# Patient Record
(demographics unavailable — no encounter records)

---

## 2025-04-23 NOTE — HISTORY OF PRESENT ILLNESS
[de-identified] : Mr. STEFFEN CAIN  is a 50 year old male who presents to the office for a follow-up visit.   He continues to have low back pain with radicular symptoms.  He has participated in PT last year and would like to do PT for his lower back.

## 2025-04-23 NOTE — DISCUSSION/SUMMARY
[de-identified] : We discussed further treatment options. He would like to try some additional physical therapy. He also continues to have pain. I have recommended a lumbar MRI. Follow up afterwards.

## 2025-04-23 NOTE — HISTORY OF PRESENT ILLNESS
[de-identified] : Mr. STEFFEN CAIN  is a 50 year old male who presents to the office for a follow-up visit.   He continues to have low back pain with radicular symptoms.  He has participated in PT last year and would like to do PT for his lower back.

## 2025-04-23 NOTE — PHYSICAL EXAM
[de-identified] : Examination of the lumbar spine reveals no midline tenderness palpation, step-offs, or skin lesions. Decreased range of motion with respect to flexion, extension, lateral bending, and rotation. No tenderness to palpation of the sciatic notch. No tenderness palpation of the bilateral greater trochanters. No pain with passive internal/external rotation of the hips. No instability of bilateral lower extremities.  Negative TAVO. Negative straight leg raise bilaterally. No bowstring. Negative femoral stretch. 5 out of 5 iliopsoas, hip abductors, hips adductors, quadriceps, hamstrings, gastrocsoleus, tibialis anterior, extensor hallucis longus, peroneals. Grossly intact sensation to light touch bilateral lower extremities. 1+ patellar and Achilles reflexes. Downgoing Babinski. No clonus. Intact proprioception. Palpable pulses. No skin lesion and no edema on the right and left lower extremities. [de-identified] : AP lateral lumbar x-rays with mild loss of disc height without instability or aggressive lesions

## 2025-04-23 NOTE — PHYSICAL EXAM
[de-identified] : Examination of the lumbar spine reveals no midline tenderness palpation, step-offs, or skin lesions. Decreased range of motion with respect to flexion, extension, lateral bending, and rotation. No tenderness to palpation of the sciatic notch. No tenderness palpation of the bilateral greater trochanters. No pain with passive internal/external rotation of the hips. No instability of bilateral lower extremities.  Negative TAVO. Negative straight leg raise bilaterally. No bowstring. Negative femoral stretch. 5 out of 5 iliopsoas, hip abductors, hips adductors, quadriceps, hamstrings, gastrocsoleus, tibialis anterior, extensor hallucis longus, peroneals. Grossly intact sensation to light touch bilateral lower extremities. 1+ patellar and Achilles reflexes. Downgoing Babinski. No clonus. Intact proprioception. Palpable pulses. No skin lesion and no edema on the right and left lower extremities. [de-identified] : AP lateral lumbar x-rays with mild loss of disc height without instability or aggressive lesions

## 2025-04-23 NOTE — END OF VISIT
[FreeTextEntry3] : All medical record entries made by the Scribe were at my, Odin Simon MD, direction and personally dictated by me on 04/23/2025. I have reviewed the chart and agree that the record accurately reflects my personal performance of the history, physical exam, assessment and plan. I have also personally directed, reviewed, and agreed with the chart. [Time Spent: ___ minutes] : I have spent [unfilled] minutes of time on the encounter which excludes teaching and separately reported services.

## 2025-04-23 NOTE — DISCUSSION/SUMMARY
[de-identified] : We discussed further treatment options. He would like to try some additional physical therapy. He also continues to have pain. I have recommended a lumbar MRI. Follow up afterwards.

## 2025-04-23 NOTE — ADDENDUM
[FreeTextEntry1] : I, Mikey Ch, documented this note as a scribe on behalf of Odin Simon MD on 04/23/2025.

## 2025-06-09 NOTE — HISTORY OF PRESENT ILLNESS
[de-identified] : Mr. STEFFEN CAIN  is a 50 year old male who presents to the office for a follow-up visit.   He is here to review his MRI results.

## 2025-06-09 NOTE — PHYSICAL EXAM
[de-identified] : Examination of the lumbar spine reveals no midline tenderness palpation, step-offs, or skin lesions. Decreased range of motion with respect to flexion, extension, lateral bending, and rotation. No tenderness to palpation of the sciatic notch. No tenderness palpation of the bilateral greater trochanters. No pain with passive internal/external rotation of the hips. No instability of bilateral lower extremities.  Negative TAVO. Negative straight leg raise bilaterally. No bowstring. Negative femoral stretch. 5 out of 5 iliopsoas, hip abductors, hips adductors, quadriceps, hamstrings, gastrocsoleus, tibialis anterior, extensor hallucis longus, peroneals. Grossly intact sensation to light touch bilateral lower extremities. 1+ patellar and Achilles reflexes. Downgoing Babinski. No clonus. Intact proprioception. Palpable pulses. No skin lesion and no edema on the right and left lower extremities. [de-identified] : AP lateral lumbar x-rays with mild loss of disc height without instability or aggressive lesions  Lumbar MRI does reveal some L4-5 spondylolisthesis.  There is some increased lateral recess and foraminal stenosis